# Patient Record
Sex: FEMALE | Race: WHITE | NOT HISPANIC OR LATINO | Employment: FULL TIME | URBAN - METROPOLITAN AREA
[De-identification: names, ages, dates, MRNs, and addresses within clinical notes are randomized per-mention and may not be internally consistent; named-entity substitution may affect disease eponyms.]

---

## 2017-09-12 ENCOUNTER — GENERIC CONVERSION - ENCOUNTER (OUTPATIENT)
Dept: OTHER | Facility: OTHER | Age: 42
End: 2017-09-12

## 2018-01-22 VITALS
DIASTOLIC BLOOD PRESSURE: 68 MMHG | SYSTOLIC BLOOD PRESSURE: 102 MMHG | OXYGEN SATURATION: 100 % | TEMPERATURE: 98.7 F | HEIGHT: 62 IN | HEART RATE: 63 BPM | BODY MASS INDEX: 23 KG/M2 | WEIGHT: 125 LBS | RESPIRATION RATE: 16 BRPM

## 2018-09-08 ENCOUNTER — OFFICE VISIT (OUTPATIENT)
Dept: URGENT CARE | Facility: CLINIC | Age: 43
End: 2018-09-08
Payer: COMMERCIAL

## 2018-09-08 VITALS
BODY MASS INDEX: 24.25 KG/M2 | WEIGHT: 131.8 LBS | OXYGEN SATURATION: 100 % | SYSTOLIC BLOOD PRESSURE: 100 MMHG | HEART RATE: 76 BPM | RESPIRATION RATE: 16 BRPM | TEMPERATURE: 98.8 F | DIASTOLIC BLOOD PRESSURE: 68 MMHG | HEIGHT: 62 IN

## 2018-09-08 DIAGNOSIS — R39.89 URINARY PROBLEM: Primary | ICD-10-CM

## 2018-09-08 LAB
SL AMB  POCT GLUCOSE, UA: ABNORMAL
SL AMB LEUKOCYTE ESTERASE,UA: ABNORMAL
SL AMB POCT BILIRUBIN,UA: ABNORMAL
SL AMB POCT BLOOD,UA: ABNORMAL
SL AMB POCT CLARITY,UA: CLEAR
SL AMB POCT COLOR,UA: YELLOW
SL AMB POCT KETONES,UA: ABNORMAL
SL AMB POCT NITRITE,UA: ABNORMAL
SL AMB POCT PH,UA: 5
SL AMB POCT SPECIFIC GRAVITY,UA: 1.02
SL AMB POCT URINE PROTEIN: 30
SL AMB POCT UROBILINOGEN: 0.2

## 2018-09-08 PROCEDURE — 99213 OFFICE O/P EST LOW 20 MIN: CPT | Performed by: FAMILY MEDICINE

## 2018-09-08 PROCEDURE — 87086 URINE CULTURE/COLONY COUNT: CPT | Performed by: FAMILY MEDICINE

## 2018-09-08 PROCEDURE — 81002 URINALYSIS NONAUTO W/O SCOPE: CPT | Performed by: FAMILY MEDICINE

## 2018-09-08 NOTE — PROGRESS NOTES
[facility]        NAME: Samantha Otero is a 39 y o  female  : 1975    MRN: [id number]  DATE: 2018  TIME: 12:28 PM    Assessment and Plan   Urinary problem [R39 89]  1  Urinary problem  POCT urine dip    Urine culture         Patient Instructions     Patient Instructions   Clinical presentation not classically consistent with a UTI  Urine dip in office shows small amount of blood and leukocytes  Urine sample sent for culture  Will call with results and treat if positive  Patient has been instructed to follow up w/ her GYN or PCP for pelvic exam and further evaluation for causes of symptoms  Follow up with PCP in 3-5 days  Proceed to  ER if symptoms worsen  Chief Complaint     Chief Complaint   Patient presents with    Possible UTI     Pt here for concerns of a UTI  pt states s/s x 2 weeks  Pt states voiding and then feeling like she has to go again right away  No Meds used  History of Present Illness       40 yo female presents c/o concerns for UTI  She states for the past 2 weeks, whenever she finishes urinating, she feels she still has to go more  She denies any increase in urinary urgency or frequency  No dysuria  No vaginal bleeding or discharge  No pelvic pain  No flank pain  No nausea/vomiting or diarrhea  No fever/chills  She has not been taking anything for the symptoms  Review of Systems   Review of Systems   Constitutional: Negative  Gastrointestinal: Negative  Genitourinary:        As noted in HPI   Musculoskeletal: Negative  Skin: Negative  Current Medications     No current outpatient prescriptions on file      Current Allergies     Allergies as of 2018    (No Known Allergies)            The following portions of the patient's history were reviewed and updated as appropriate: allergies, current medications, past family history, past medical history, past social history, past surgical history and problem list      History reviewed  No pertinent past medical history  History reviewed  No pertinent surgical history  Family History   Problem Relation Age of Onset    Hyperlipidemia Mother     Colon cancer Father          Medications have been verified  Objective   /68 (BP Location: Right arm, Patient Position: Sitting, Cuff Size: Standard)   Pulse 76   Temp 98 8 °F (37 1 °C) (Tympanic)   Resp 16   Ht 5' 2" (1 575 m)   Wt 59 8 kg (131 lb 12 8 oz)   SpO2 100%   BMI 24 11 kg/m²        Physical Exam     Physical Exam   Constitutional: She is oriented to person, place, and time  Vital signs are normal  She appears well-developed and well-nourished  She is active and cooperative  Non-toxic appearance  She does not have a sickly appearance  She does not appear ill  No distress  Abdominal: Soft  Normal appearance  She exhibits no distension and no mass  There is no hepatosplenomegaly  There is no tenderness  There is no rigidity, no rebound, no guarding and no CVA tenderness  Neurological: She is alert and oriented to person, place, and time  Skin: She is not diaphoretic  Psychiatric: She has a normal mood and affect  Her behavior is normal  Judgment and thought content normal    Nursing note and vitals reviewed

## 2018-09-08 NOTE — PATIENT INSTRUCTIONS
Clinical presentation not classically consistent with a UTI  Urine dip in office shows small amount of blood and leukocytes  Urine sample sent for culture  Will call with results and treat if positive  Patient has been instructed to follow up w/ her GYN or PCP for pelvic exam and further evaluation for causes of symptoms

## 2018-09-10 ENCOUNTER — TELEPHONE (OUTPATIENT)
Dept: URGENT CARE | Facility: CLINIC | Age: 43
End: 2018-09-10

## 2018-09-10 LAB — BACTERIA UR CULT: ABNORMAL

## 2018-09-10 NOTE — TELEPHONE ENCOUNTER
Reviewed culture results with patient  She states she did have slight symptoms yesterday, however they did resolve today and have not recurred  Advised that she follow up with her family doctor for retesting if symptoms persist or if recur  She was in agreement with this  All questions answered

## 2021-01-04 ENCOUNTER — NURSE TRIAGE (OUTPATIENT)
Dept: OTHER | Facility: OTHER | Age: 46
End: 2021-01-04

## 2021-01-04 DIAGNOSIS — Z20.828 SARS-ASSOCIATED CORONAVIRUS EXPOSURE: Primary | ICD-10-CM

## 2021-01-05 DIAGNOSIS — Z20.828 SARS-ASSOCIATED CORONAVIRUS EXPOSURE: ICD-10-CM

## 2021-01-05 PROCEDURE — U0003 INFECTIOUS AGENT DETECTION BY NUCLEIC ACID (DNA OR RNA); SEVERE ACUTE RESPIRATORY SYNDROME CORONAVIRUS 2 (SARS-COV-2) (CORONAVIRUS DISEASE [COVID-19]), AMPLIFIED PROBE TECHNIQUE, MAKING USE OF HIGH THROUGHPUT TECHNOLOGIES AS DESCRIBED BY CMS-2020-01-R: HCPCS | Performed by: FAMILY MEDICINE

## 2021-01-05 NOTE — TELEPHONE ENCOUNTER
Regarding: Covid - exposure (1 of Berta)  ----- Message from Juan Zepeda sent at 1/4/2021  5:01 PM EST -----  "My oldest daughter Taye Andrade tested positive on Friday   The rest of us don't have symptoms but we want to get tested "

## 2021-01-05 NOTE — TELEPHONE ENCOUNTER
Reason for Disposition   [1] CLOSE CONTACT COVID-19 EXPOSURE within last 14 days AND [2] NO symptoms    Answer Assessment - Initial Assessment Questions  1  COVID-19 CLOSE CONTACT: "Who is the person with the confirmed or suspected COVID-19 infection that you were exposed to?"      Abbi  2  PLACE of CONTACT: "Where were you when you were exposed to COVID-19?" (e g , home, school, medical waiting room; which city?)     LIVES TOGETHER  3  TYPE of CONTACT: "How much contact was there?" (e g , sitting next to, live in same house, work in same office, same building)     DIRECT  4  DURATION of CONTACT: "How long were you in contact with the COVID-19 patient?" (e g , a few seconds, passed by person, a few minutes, 15 minutes or longer, live with the patient)    LIVES TOGETHER  5  MASK: "Were you wearing a mask?" "Was the other person wearing a mask?" Note: wearing a mask reduces the risk of an   otherwise close contact  NO  6  DATE of CONTACT: "When did you have contact with a COVID-19 patient?" (e g , how many days ago)    CONSTANT 1 1 21  7  COMMUNITY SPREAD: "Are there lots of cases of COVID-19 (community spread) where you live?" (See public health department website, if unsure)       YES  8  SYMPTOMS: "Do you have any symptoms?" (e g , fever, cough, breathing difficulty, loss of taste or smell)     NO  9  PREGNANCY OR POSTPARTUM: "Is there any chance you are pregnant?" "When was your last menstrual period?" "Did you deliver in the last 2 weeks?"    NA  10  HIGH RISK: "Do you have any heart or lung problems? Do you have a weak immune system?" (e g , heart failure, COPD, asthma, HIV positive, chemotherapy, renal failure, diabetes mellitus, sickle cell anemia, obesity)      NO  11    TRAVEL: "Have you traveled out of the country recently?" If so, "When and where?"  Also ask about out-of-state travel, since the CDC has identified some high-risk cities for community spread in the [address]  Note: Travel becomes less relevant if there is widespread community transmission where the patient lives        NO    Protocols used: CORONAVIRUS (COVID-19) EXPOSURE-ADULT-AH

## 2021-01-06 DIAGNOSIS — U07.1 COVID-19: Primary | ICD-10-CM

## 2021-01-06 LAB — SARS-COV-2 RNA RESP QL NAA+PROBE: NEGATIVE

## 2021-04-13 DIAGNOSIS — Z23 ENCOUNTER FOR IMMUNIZATION: ICD-10-CM

## 2021-04-26 ENCOUNTER — IMMUNIZATIONS (OUTPATIENT)
Dept: FAMILY MEDICINE CLINIC | Facility: HOSPITAL | Age: 46
End: 2021-04-26

## 2021-04-26 DIAGNOSIS — Z23 ENCOUNTER FOR IMMUNIZATION: Primary | ICD-10-CM

## 2021-04-26 PROCEDURE — 0001A SARS-COV-2 / COVID-19 MRNA VACCINE (PFIZER-BIONTECH) 30 MCG: CPT

## 2021-04-26 PROCEDURE — 91300 SARS-COV-2 / COVID-19 MRNA VACCINE (PFIZER-BIONTECH) 30 MCG: CPT

## 2021-05-17 ENCOUNTER — IMMUNIZATIONS (OUTPATIENT)
Dept: FAMILY MEDICINE CLINIC | Facility: HOSPITAL | Age: 46
End: 2021-05-17

## 2021-05-17 DIAGNOSIS — Z23 ENCOUNTER FOR IMMUNIZATION: Primary | ICD-10-CM

## 2021-05-17 PROCEDURE — 91300 SARS-COV-2 / COVID-19 MRNA VACCINE (PFIZER-BIONTECH) 30 MCG: CPT

## 2021-05-17 PROCEDURE — 0002A SARS-COV-2 / COVID-19 MRNA VACCINE (PFIZER-BIONTECH) 30 MCG: CPT

## 2021-12-15 ENCOUNTER — IMMUNIZATIONS (OUTPATIENT)
Dept: FAMILY MEDICINE CLINIC | Facility: HOSPITAL | Age: 46
End: 2021-12-15

## 2021-12-15 DIAGNOSIS — Z23 ENCOUNTER FOR IMMUNIZATION: Primary | ICD-10-CM

## 2021-12-15 PROCEDURE — 0001A COVID-19 PFIZER VACC 0.3 ML: CPT

## 2021-12-15 PROCEDURE — 91300 COVID-19 PFIZER VACC 0.3 ML: CPT

## 2023-06-07 ENCOUNTER — OFFICE VISIT (OUTPATIENT)
Dept: URGENT CARE | Facility: CLINIC | Age: 48
End: 2023-06-07
Payer: COMMERCIAL

## 2023-06-07 VITALS
TEMPERATURE: 98.9 F | HEART RATE: 76 BPM | WEIGHT: 138.4 LBS | RESPIRATION RATE: 20 BRPM | DIASTOLIC BLOOD PRESSURE: 70 MMHG | SYSTOLIC BLOOD PRESSURE: 114 MMHG | HEIGHT: 63 IN | BODY MASS INDEX: 24.52 KG/M2 | OXYGEN SATURATION: 98 %

## 2023-06-07 DIAGNOSIS — J30.2 SEASONAL ALLERGIES: Primary | ICD-10-CM

## 2023-06-07 PROCEDURE — 99213 OFFICE O/P EST LOW 20 MIN: CPT | Performed by: FAMILY MEDICINE

## 2023-06-07 NOTE — PROGRESS NOTES
[facility]        NAME: Kamaljit House is a 51 y o  female  : 1975    MRN: [id number]  DATE: 2023  TIME: 4:08 PM    Assessment and Plan   Seasonal allergies [J30 2]  1  Seasonal allergies              Patient Instructions     Patient Instructions   - rest and drink plenty of fluids  - take Tylenol or Motrin as needed   - advised warm salt water gargles and throat lozenges as needed   - drink warm tea w/ lemon and honey   - run a humidifier at home   - advised using Flonase nasal spray   - may continue Mucinex as needed for cough  - advised starting allergy medication such as Claritin or Zyrtec  - if symptoms persist despite treatment, worsen, or any new symptoms present, should be seen by PCP for re-check         Follow up with PCP in 3-5 days  Proceed to  ER if symptoms worsen  Chief Complaint     Chief Complaint   Patient presents with   • Cough     Pt here ill x 4 days pt states cough, post nasal drip, itchy watery eyes  Pt used Mucinex D  History of Present Illness       50 yo female presents c/o sneezing, itchy watery eyes, nasal congestion, rhinorrhea, post-nasal drip, sore throat, and cough  Symptoms have been present x 4 days  No fever/chills  No headache or body aches  No chest pain, SOB, or wheezing  Non-smoker  No GI sx  No skin rashes  No loss of taste or smell  No recent travel or known exposure to anyone with COVID-19  Patient has a history of seasonal/environmental allergies  She has taken Mucinex and Nyquil for the symptoms  Review of Systems   Review of Systems   Constitutional: Negative  HENT:        As noted in HPI   Eyes:        As noted in HPI   Respiratory:        As noted in HPI   Cardiovascular: Negative  Gastrointestinal: Negative  Musculoskeletal: Negative  Skin: Negative  Allergic/Immunologic: Positive for environmental allergies  Neurological: Negative  Hematological: Negative            Current Medications     No current "outpatient medications on file  Current Allergies     Allergies as of 06/07/2023   • (No Known Allergies)            The following portions of the patient's history were reviewed and updated as appropriate: allergies, current medications, past family history, past medical history, past social history, past surgical history and problem list      Past Medical History:   Diagnosis Date   • Patient denies medical problems        Past Surgical History:   Procedure Laterality Date   • MOHS SURGERY      upper lip       Family History   Problem Relation Age of Onset   • Hyperlipidemia Mother    • Colon cancer Father          Medications have been verified  Objective   /70   Pulse 76   Temp 98 9 °F (37 2 °C)   Resp 20   Ht 5' 3\" (1 6 m)   Wt 62 8 kg (138 lb 6 4 oz)   SpO2 98%   BMI 24 52 kg/m²   No LMP recorded  Physical Exam     Physical Exam  Vitals and nursing note reviewed  Constitutional:       General: She is awake  She is not in acute distress  Appearance: Normal appearance  She is well-developed and well-groomed  She is not ill-appearing, toxic-appearing or diaphoretic  HENT:      Head: Normocephalic and atraumatic  Jaw: There is normal jaw occlusion  Right Ear: Tympanic membrane, ear canal and external ear normal       Left Ear: Tympanic membrane, ear canal and external ear normal       Nose: Mucosal edema present  Right Sinus: No maxillary sinus tenderness or frontal sinus tenderness  Left Sinus: No maxillary sinus tenderness or frontal sinus tenderness  Mouth/Throat:      Lips: Pink  No lesions  Mouth: Mucous membranes are moist       Pharynx: Oropharynx is clear  Uvula midline  No pharyngeal swelling, oropharyngeal exudate, posterior oropharyngeal erythema or uvula swelling  Tonsils: No tonsillar exudate or tonsillar abscesses  Eyes:      General: Lids are normal  Vision grossly intact  Gaze aligned appropriately        Extraocular " Movements: Extraocular movements intact  Conjunctiva/sclera: Conjunctivae normal       Pupils: Pupils are equal, round, and reactive to light  Neck:      Trachea: Trachea and phonation normal    Cardiovascular:      Rate and Rhythm: Normal rate and regular rhythm  Pulses: Normal pulses  Heart sounds: Normal heart sounds  Pulmonary:      Effort: Pulmonary effort is normal  No tachypnea, accessory muscle usage or respiratory distress  Breath sounds: Normal breath sounds and air entry  Musculoskeletal:      Cervical back: Neck supple  No edema, erythema, rigidity or tenderness  Lymphadenopathy:      Cervical: No cervical adenopathy  Skin:     General: Skin is warm and dry  Capillary Refill: Capillary refill takes less than 2 seconds  Coloration: Skin is not pale  Neurological:      Mental Status: She is alert and oriented to person, place, and time  Mental status is at baseline  Psychiatric:         Mood and Affect: Mood normal          Behavior: Behavior normal  Behavior is cooperative  Thought Content:  Thought content normal          Judgment: Judgment normal

## 2023-06-07 NOTE — PATIENT INSTRUCTIONS
- rest and drink plenty of fluids  - take Tylenol or Motrin as needed   - advised warm salt water gargles and throat lozenges as needed   - drink warm tea w/ lemon and honey   - run a humidifier at home   - advised using Flonase nasal spray   - may continue Mucinex as needed for cough  - advised starting allergy medication such as Claritin or Zyrtec  - if symptoms persist despite treatment, worsen, or any new symptoms present, should be seen by PCP for re-check